# Patient Record
Sex: FEMALE | Race: WHITE | NOT HISPANIC OR LATINO | Employment: STUDENT | URBAN - METROPOLITAN AREA
[De-identification: names, ages, dates, MRNs, and addresses within clinical notes are randomized per-mention and may not be internally consistent; named-entity substitution may affect disease eponyms.]

---
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
## 2022-03-27 ENCOUNTER — HEALTH MAINTENANCE LETTER (OUTPATIENT)
Age: 22
End: 2022-03-27

## 2022-08-01 DIAGNOSIS — L70.0 ACNE VULGARIS: ICD-10-CM

## 2022-08-01 RX ORDER — DESOGESTREL AND ETHINYL ESTRADIOL 0.15-0.03
1 KIT ORAL DAILY
Qty: 84 TABLET | Refills: 0 | Status: SHIPPED | OUTPATIENT
Start: 2022-08-01 | End: 2022-10-13 | Stop reason: SINTOL

## 2022-09-25 ENCOUNTER — HEALTH MAINTENANCE LETTER (OUTPATIENT)
Age: 22
End: 2022-09-25

## 2022-10-04 PROBLEM — R11.2 NON-INTRACTABLE VOMITING WITH NAUSEA: Status: ACTIVE | Noted: 2021-10-27

## 2022-10-06 ASSESSMENT — ANXIETY QUESTIONNAIRES
6. BECOMING EASILY ANNOYED OR IRRITABLE: MORE THAN HALF THE DAYS
3. WORRYING TOO MUCH ABOUT DIFFERENT THINGS: MORE THAN HALF THE DAYS
5. BEING SO RESTLESS THAT IT IS HARD TO SIT STILL: NEARLY EVERY DAY
GAD7 TOTAL SCORE: 15
4. TROUBLE RELAXING: NEARLY EVERY DAY
IF YOU CHECKED OFF ANY PROBLEMS ON THIS QUESTIONNAIRE, HOW DIFFICULT HAVE THESE PROBLEMS MADE IT FOR YOU TO DO YOUR WORK, TAKE CARE OF THINGS AT HOME, OR GET ALONG WITH OTHER PEOPLE: VERY DIFFICULT
2. NOT BEING ABLE TO STOP OR CONTROL WORRYING: MORE THAN HALF THE DAYS
GAD7 TOTAL SCORE: 15
7. FEELING AFRAID AS IF SOMETHING AWFUL MIGHT HAPPEN: SEVERAL DAYS
8. IF YOU CHECKED OFF ANY PROBLEMS, HOW DIFFICULT HAVE THESE MADE IT FOR YOU TO DO YOUR WORK, TAKE CARE OF THINGS AT HOME, OR GET ALONG WITH OTHER PEOPLE?: VERY DIFFICULT
GAD7 TOTAL SCORE: 15
7. FEELING AFRAID AS IF SOMETHING AWFUL MIGHT HAPPEN: SEVERAL DAYS
1. FEELING NERVOUS, ANXIOUS, OR ON EDGE: MORE THAN HALF THE DAYS

## 2022-10-06 ASSESSMENT — PATIENT HEALTH QUESTIONNAIRE - PHQ9
SUM OF ALL RESPONSES TO PHQ QUESTIONS 1-9: 17
SUM OF ALL RESPONSES TO PHQ QUESTIONS 1-9: 17
10. IF YOU CHECKED OFF ANY PROBLEMS, HOW DIFFICULT HAVE THESE PROBLEMS MADE IT FOR YOU TO DO YOUR WORK, TAKE CARE OF THINGS AT HOME, OR GET ALONG WITH OTHER PEOPLE: SOMEWHAT DIFFICULT

## 2022-10-13 ENCOUNTER — VIRTUAL VISIT (OUTPATIENT)
Dept: FAMILY MEDICINE | Facility: CLINIC | Age: 22
End: 2022-10-13
Payer: COMMERCIAL

## 2022-10-13 DIAGNOSIS — L70.0 ACNE VULGARIS: ICD-10-CM

## 2022-10-13 DIAGNOSIS — F41.9 ANXIETY: Primary | ICD-10-CM

## 2022-10-13 DIAGNOSIS — R11.2 NON-INTRACTABLE VOMITING WITH NAUSEA: ICD-10-CM

## 2022-10-13 PROCEDURE — 99214 OFFICE O/P EST MOD 30 MIN: CPT | Mod: GT | Performed by: FAMILY MEDICINE

## 2022-10-13 RX ORDER — FAMOTIDINE 40 MG/1
TABLET, FILM COATED ORAL
COMMUNITY
Start: 2021-03-05

## 2022-10-13 RX ORDER — POLYMYXIN B SULFATE AND TRIMETHOPRIM 1; 10000 MG/ML; [USP'U]/ML
1 SOLUTION OPHTHALMIC EVERY 4 HOURS
COMMUNITY
Start: 2022-10-10 | End: 2022-10-17

## 2022-10-13 RX ORDER — SERTRALINE HYDROCHLORIDE 25 MG/1
25 TABLET, FILM COATED ORAL EVERY MORNING
Qty: 30 TABLET | Refills: 1 | Status: SHIPPED | OUTPATIENT
Start: 2022-10-13 | End: 2022-11-06

## 2022-10-13 RX ORDER — NORETHINDRONE ACETATE AND ETHINYL ESTRADIOL 1MG-20(21)
1 KIT ORAL DAILY
Qty: 84 TABLET | Refills: 3 | Status: SHIPPED | OUTPATIENT
Start: 2022-10-13 | End: 2023-11-16

## 2022-10-13 ASSESSMENT — PATIENT HEALTH QUESTIONNAIRE - PHQ9
10. IF YOU CHECKED OFF ANY PROBLEMS, HOW DIFFICULT HAVE THESE PROBLEMS MADE IT FOR YOU TO DO YOUR WORK, TAKE CARE OF THINGS AT HOME, OR GET ALONG WITH OTHER PEOPLE: SOMEWHAT DIFFICULT
SUM OF ALL RESPONSES TO PHQ QUESTIONS 1-9: 17

## 2022-10-13 ASSESSMENT — ANXIETY QUESTIONNAIRES: GAD7 TOTAL SCORE: 15

## 2022-10-13 NOTE — PROGRESS NOTES
Rosalia is a 22 year old who is being evaluated via a billable video visit.      How would you like to obtain your AVS? MyChart  If the video visit is dropped, the invitation should be resent by: Text to cell phone: [phone]  Will anyone else be joining your video visit? No    Assessment & Plan     Anxiety  Little improvement of anxiety symptoms on Lexapro so she self discontinued this.  We will trial alternative SSRI, sertraline.  Recommend follow-up in 1 month to reassess and possibly increase dose prior to changing to an alternative class of medication.  Also encourage she look into psychotherapy through her [employer] [employer].  - sertraline (ZOLOFT) 25 MG tablet  Dispense: 30 tablet; Refill: 1    Acne vulgaris  Non-intractable vomiting with nausea, unspecified vomiting type  Recently started OCP for acne but has since developed recurrent morning nausea/vomiting suspect due to sensitivity to estrogen.  Because of this we will discontinue current OCP and change to lower hormone dose.  She has seen dermatology in the past and failed alternative regimens.  If symptoms persist despite dose adjustment, should retrial alternative topicals versus spironolactone.  - norethindrone-ethinyl estradiol (JUNEL FE 1/20) 1-20 MG-MCG tablet  Dispense: 84 tablet; Refill: 3    Dot Rosario [facility]    Subjective   Rosalia is a 22 year old, presenting for the following health issues:  Recheck Medication (Med check, stopped taking lexapro doesn't seem to work)      History of Present Illness       Mental Health Follow-up:  Patient presents to follow-up on Depression & Anxiety.Patient's depression since last visit has been:  Better  The patient is not having other symptoms associated with depression.  Patient's anxiety since last visit has been:  Bad  The patient is having other symptoms associated with anxiety.  Any significant life events: other  Patient is feeling anxious or having  panic attacks.  Patient has no concerns about alcohol or drug use.    She eats 2-3 servings of fruits and vegetables daily.She consumes 0 sweetened beverage(s) daily.She exercises with enough effort to increase her heart rate 30 to 60 minutes per day.  She exercises with enough effort to increase her heart rate 4 days per week. She is missing 1 dose(s) of medications per week.  She is not taking prescribed medications regularly due to side effects.    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.  Living with friends which has been helpful for her mental health, she has been able to be more social.  Started on Lexapro last year for anxiety.  Only noticed mild improvement with this medication and self discontinued it 4 months ago.  Feels anxiety is still a significant concern.  She has not looked into psychotherapy.  She is not having panic attacks.  Denies SI/HI.    After being seen last year for nausea/vomiting she was able to follow-up with GI.  Underwent endoscopy with no significant findings.  She discontinued her OCP and emesis resolved.  More recently she restarted OCP for acne and her symptoms returned.  She typically throws up every morning since being back on the pill.      Objective           Vitals:  No vitals were obtained today due to virtual visit.    Physical Exam   GENERAL: Healthy, alert and no distress  EYES: Eyes grossly normal to inspection.  No discharge or erythema, or obvious scleral/conjunctival abnormalities.  RESP: No audible wheeze, cough, or visible cyanosis.  No visible retractions or increased work of breathing.    SKIN: Visible skin clear. No  significant rash, abnormal pigmentation or lesions.  NEURO: Cranial nerves grossly intact.  Mentation and speech appropriate for age.  PSYCH: Mentation appears normal, affect normal/bright, judgement and insight intact, normal speech and appearance well-groomed.          Video-Visit Details    Video Start Time: 10:10 AM    Type of service:  Video Visit    Video End Time:10:20 AM    Originating Location (pt. Location): Home    Distant Location (provider location):  [facility]     Platform used for Video Visit: Rounds

## 2022-11-04 DIAGNOSIS — F41.9 ANXIETY: ICD-10-CM

## 2022-11-05 NOTE — TELEPHONE ENCOUNTER
"Routing refill request to provider for review/approval because:  Patient needs to be seen because it has been more than 1 year since last office visit.    ?PCP    Last Written Prescription Date:  10/13/22  Last Fill Quantity: 30,  # refills: 1   Last office visit provider:  10/27/21     Requested Prescriptions   Pending Prescriptions Disp Refills     sertraline (ZOLOFT) 25 MG tablet [Pharmacy Med Name: SERTRALINE HCL 25 MG TABLET] 90 tablet 1     Sig: TAKE 1 TABLET BY MOUTH EVERY MORNING       SSRIs Protocol Passed - 11/4/2022  9:33 AM        Passed - Recent (12 mo) or future (30 days) visit within the authorizing provider's specialty     Patient has had an office visit with the authorizing provider or a provider within the authorizing providers department within the previous 12 mos or has a future within next 30 days. See \"Patient Info\" tab in inbasket, or \"Choose Columns\" in Meds & Orders section of the refill encounter.              Passed - Medication is active on med list        Passed - Patient is age 18 or older        Passed - No active pregnancy on record        Passed - No positive pregnancy test in last 12 months             Leora Olsen, RN 11/05/22 5:26 PM  "

## 2022-11-06 RX ORDER — SERTRALINE HYDROCHLORIDE 25 MG/1
TABLET, FILM COATED ORAL
Qty: 90 TABLET | Refills: 1 | Status: SHIPPED | OUTPATIENT
Start: 2022-11-06 | End: 2023-05-04

## 2023-05-04 DIAGNOSIS — F41.9 ANXIETY: ICD-10-CM

## 2023-05-04 RX ORDER — SERTRALINE HYDROCHLORIDE 25 MG/1
TABLET, FILM COATED ORAL
Qty: 90 TABLET | Refills: 0 | Status: SHIPPED | OUTPATIENT
Start: 2023-05-04 | End: 2023-11-16

## 2023-05-05 NOTE — TELEPHONE ENCOUNTER
"Last Written Prescription Date:  11/6/2022  Last Fill Quantity: 90,  # refills:1   Last office visit provider:  Virtual visit 10/13/2022 with DR BESSIE Rosario @ [facility]     Requested Prescriptions   Pending Prescriptions Disp Refills     sertraline (ZOLOFT) 25 MG tablet [Pharmacy Med Name: SERTRALINE HCL 25 MG TABLET] 90 tablet 1     Sig: TAKE 1 TABLET BY MOUTH EVERY DAY IN THE MORNING       SSRIs Protocol Passed - 5/4/2023  3:06 AM        Passed - Recent (12 mo) or future (30 days) visit within the authorizing provider's specialty     Patient has had an office visit with the authorizing provider or a provider within the authorizing providers department within the previous 12 mos or has a future within next 30 days. See \"Patient Info\" tab in inbasket, or \"Choose Columns\" in Meds & Orders section of the refill encounter.              Passed - Medication is active on med list        Passed - Patient is age 18 or older        Passed - No active pregnancy on record        Passed - No positive pregnancy test in last 12 months             Rosa Silva RN 05/04/23 9:02 PM  "

## 2023-05-08 ENCOUNTER — HEALTH MAINTENANCE LETTER (OUTPATIENT)
Age: 23
End: 2023-05-08

## 2023-09-21 ENCOUNTER — LAB REQUISITION (OUTPATIENT)
Dept: LAB | Facility: CLINIC | Age: 23
End: 2023-09-21
Payer: COMMERCIAL

## 2023-09-21 DIAGNOSIS — Z11.3 ENCOUNTER FOR SCREENING FOR INFECTIONS WITH A PREDOMINANTLY SEXUAL MODE OF TRANSMISSION: ICD-10-CM

## 2023-09-21 DIAGNOSIS — Z01.419 ENCOUNTER FOR GYNECOLOGICAL EXAMINATION (GENERAL) (ROUTINE) WITHOUT ABNORMAL FINDINGS: ICD-10-CM

## 2023-09-21 PROCEDURE — G0145 SCR C/V CYTO,THINLAYER,RESCR: HCPCS | Mod: ORL | Performed by: STUDENT IN AN ORGANIZED HEALTH CARE EDUCATION/TRAINING PROGRAM

## 2023-09-21 PROCEDURE — 87491 CHLMYD TRACH DNA AMP PROBE: CPT | Mod: ORL | Performed by: STUDENT IN AN ORGANIZED HEALTH CARE EDUCATION/TRAINING PROGRAM

## 2023-09-22 LAB
C TRACH DNA SPEC QL PROBE+SIG AMP: NEGATIVE
N GONORRHOEA DNA SPEC QL NAA+PROBE: NEGATIVE

## 2023-09-25 LAB
BKR LAB AP GYN ADEQUACY: NORMAL
BKR LAB AP GYN INTERPRETATION: NORMAL
BKR LAB AP HPV REFLEX: NORMAL
BKR LAB AP LMP: NORMAL
BKR LAB AP PREVIOUS ABNL DX: NORMAL
BKR LAB AP PREVIOUS ABNORMAL: NORMAL
PATH REPORT.COMMENTS IMP SPEC: NORMAL
PATH REPORT.COMMENTS IMP SPEC: NORMAL
PATH REPORT.RELEVANT HX SPEC: NORMAL

## 2023-11-15 ASSESSMENT — ANXIETY QUESTIONNAIRES
1. FEELING NERVOUS, ANXIOUS, OR ON EDGE: MORE THAN HALF THE DAYS
IF YOU CHECKED OFF ANY PROBLEMS ON THIS QUESTIONNAIRE, HOW DIFFICULT HAVE THESE PROBLEMS MADE IT FOR YOU TO DO YOUR WORK, TAKE CARE OF THINGS AT HOME, OR GET ALONG WITH OTHER PEOPLE: SOMEWHAT DIFFICULT
5. BEING SO RESTLESS THAT IT IS HARD TO SIT STILL: SEVERAL DAYS
GAD7 TOTAL SCORE: 13
4. TROUBLE RELAXING: SEVERAL DAYS
GAD7 TOTAL SCORE: 13
2. NOT BEING ABLE TO STOP OR CONTROL WORRYING: NEARLY EVERY DAY
6. BECOMING EASILY ANNOYED OR IRRITABLE: NEARLY EVERY DAY
3. WORRYING TOO MUCH ABOUT DIFFERENT THINGS: MORE THAN HALF THE DAYS
7. FEELING AFRAID AS IF SOMETHING AWFUL MIGHT HAPPEN: SEVERAL DAYS

## 2023-11-15 ASSESSMENT — PATIENT HEALTH QUESTIONNAIRE - PHQ9
10. IF YOU CHECKED OFF ANY PROBLEMS, HOW DIFFICULT HAVE THESE PROBLEMS MADE IT FOR YOU TO DO YOUR WORK, TAKE CARE OF THINGS AT HOME, OR GET ALONG WITH OTHER PEOPLE: SOMEWHAT DIFFICULT
SUM OF ALL RESPONSES TO PHQ QUESTIONS 1-9: 10
SUM OF ALL RESPONSES TO PHQ QUESTIONS 1-9: 10

## 2023-11-16 ENCOUNTER — OFFICE VISIT (OUTPATIENT)
Dept: FAMILY MEDICINE | Facility: CLINIC | Age: 23
End: 2023-11-16
Payer: COMMERCIAL

## 2023-11-16 VITALS
OXYGEN SATURATION: 96 % | SYSTOLIC BLOOD PRESSURE: 124 MMHG | HEART RATE: 80 BPM | DIASTOLIC BLOOD PRESSURE: 74 MMHG | HEIGHT: 69 IN | BODY MASS INDEX: 27.62 KG/M2 | WEIGHT: 186.5 LBS | TEMPERATURE: 98.6 F | RESPIRATION RATE: 16 BRPM

## 2023-11-16 DIAGNOSIS — R11.0 NAUSEA: ICD-10-CM

## 2023-11-16 DIAGNOSIS — K31.89 REACTIVE GASTROPATHY: ICD-10-CM

## 2023-11-16 DIAGNOSIS — F41.9 ANXIETY: ICD-10-CM

## 2023-11-16 DIAGNOSIS — F32.1 CURRENT MODERATE EPISODE OF MAJOR DEPRESSIVE DISORDER WITHOUT PRIOR EPISODE (H): ICD-10-CM

## 2023-11-16 DIAGNOSIS — K31.89 REACTIVE GASTROPATHY: Primary | ICD-10-CM

## 2023-11-16 DIAGNOSIS — R14.0 BLOATING: ICD-10-CM

## 2023-11-16 PROBLEM — R10.9 NONSPECIFIC ABDOMINAL PAIN: Status: ACTIVE | Noted: 2021-11-03

## 2023-11-16 PROBLEM — R11.14 BILIOUS VOMITING: Status: ACTIVE | Noted: 2023-11-16

## 2023-11-16 PROBLEM — K29.70 GASTRITIS: Status: ACTIVE | Noted: 2021-12-07

## 2023-11-16 PROCEDURE — 99214 OFFICE O/P EST MOD 30 MIN: CPT | Performed by: FAMILY MEDICINE

## 2023-11-16 RX ORDER — OMEPRAZOLE 40 MG/1
40 CAPSULE, DELAYED RELEASE ORAL DAILY
Qty: 90 CAPSULE | Refills: 0 | Status: SHIPPED | OUTPATIENT
Start: 2023-11-16

## 2023-11-16 RX ORDER — FAMOTIDINE 20 MG/1
20 TABLET, FILM COATED ORAL 2 TIMES DAILY
Qty: 180 TABLET | Refills: 0 | OUTPATIENT
Start: 2023-11-16

## 2023-11-16 RX ORDER — ONDANSETRON 4 MG/1
4 TABLET, ORALLY DISINTEGRATING ORAL EVERY 8 HOURS PRN
Qty: 18 TABLET | Refills: 1 | Status: SHIPPED | OUTPATIENT
Start: 2023-11-16

## 2023-11-16 RX ORDER — FLUOXETINE 10 MG/1
10 CAPSULE ORAL DAILY
Qty: 90 CAPSULE | Refills: 3 | Status: SHIPPED | OUTPATIENT
Start: 2023-11-16

## 2023-11-16 RX ORDER — ETONOGESTREL AND ETHINYL ESTRADIOL VAGINAL RING .015; .12 MG/D; MG/D
RING VAGINAL
COMMUNITY

## 2023-11-16 RX ORDER — SIMETHICONE 125 MG
125 TABLET,CHEWABLE ORAL 4 TIMES DAILY PRN
Qty: 60 TABLET | Refills: 1 | Status: SHIPPED | OUTPATIENT
Start: 2023-11-16

## 2023-11-16 RX ORDER — FAMOTIDINE 20 MG/1
20 TABLET, FILM COATED ORAL 2 TIMES DAILY
Qty: 180 TABLET | Refills: 0 | Status: SHIPPED | OUTPATIENT
Start: 2023-11-16

## 2023-11-16 ASSESSMENT — ENCOUNTER SYMPTOMS
NAUSEA: 1
NERVOUS/ANXIOUS: 1

## 2023-11-16 ASSESSMENT — ANXIETY QUESTIONNAIRES
6. BECOMING EASILY ANNOYED OR IRRITABLE: MORE THAN HALF THE DAYS
5. BEING SO RESTLESS THAT IT IS HARD TO SIT STILL: SEVERAL DAYS
1. FEELING NERVOUS, ANXIOUS, OR ON EDGE: MORE THAN HALF THE DAYS
7. FEELING AFRAID AS IF SOMETHING AWFUL MIGHT HAPPEN: NOT AT ALL
3. WORRYING TOO MUCH ABOUT DIFFERENT THINGS: SEVERAL DAYS
2. NOT BEING ABLE TO STOP OR CONTROL WORRYING: MORE THAN HALF THE DAYS
IF YOU CHECKED OFF ANY PROBLEMS ON THIS QUESTIONNAIRE, HOW DIFFICULT HAVE THESE PROBLEMS MADE IT FOR YOU TO DO YOUR WORK, TAKE CARE OF THINGS AT HOME, OR GET ALONG WITH OTHER PEOPLE: SOMEWHAT DIFFICULT

## 2023-11-16 NOTE — PROGRESS NOTES
Assessment & Plan     Rosalia Rizvi is a 23 year old female, new to me today, presenting for evaluation of anxiety and nausea.    1. Reactive gastropathy  2. Nausea  3. Bloating  - omeprazole (PRILOSEC) 40 MG DR capsule; Take 1 capsule (40 mg) by mouth daily  Dispense: 90 capsule; Refill: 0  - famotidine (PEPCID) 20 MG tablet; Take 1 tablet (20 mg) by mouth 2 times daily  Dispense: 180 tablet; Refill: 0  - ondansetron (ZOFRAN ODT) 4 MG ODT tab; Take 1 tablet (4 mg) by mouth every 8 hours as needed for nausea  Dispense: 18 tablet; Refill: 1  - simethicone (MYLICON) 125 MG chewable tablet; Take 1 tablet (125 mg) by mouth 4 times daily as needed for intestinal gas  Dispense: 60 tablet; Refill: 1    Reviewed note from NG and EGD note.  Previous reactive gastropathy.  Negative for H. pylori.  Reviewed triggers.  Encouraged to avoid alcohol, NSAIDs, acidic or spicy foods, peppermint, caffeine, tobacco, manage stress.  Currently on omeprazole 20 mg daily.  Step up to 40 mg daily.  Can also take Pepcid 20 mg twice daily as needed for breakthrough symptoms.  Plan to treat for the next 6 to 8 weeks and then titrate off the PPI if able.  Anxiety may be exacerbating her GI symptoms.  Recommend restarting SSRI.  As SSRI may exacerbate any nausea, will send in prescription for Zofran to use as needed transiently while getting used to the medication.  Recommended trial of low FODMAP diet.  Provided patient education. Also trial simethicone for gas and bloating.  If symptoms not improving, can refer back to [facility] for follow-up as previously instructed.  No red flag symptoms present today to proceed with follow-up endoscopy or imaging.    4. Anxiety  5. Current moderate episode of major depressive disorder without prior episode (H)  - FLUoxetine (PROZAC) 10 MG capsule; Take 1 capsule (10 mg) by mouth daily  Dispense: 90 capsule; Refill: 3  - ondansetron (ZOFRAN ODT) 4 MG ODT tab; Take 1 tablet (4 mg) by  mouth every 8 hours as needed for nausea  Dispense: 18 tablet; Refill: 1    Mood scores positive for anxiety and depression.  History of anxiety and depression.  Previously failed Lexapro and Zoloft as she felt flat and did not feel the medications were helping.  I do think they will help her with her GI symptoms, do encourage her to restart.  We will trial Prozac this time.  Also encouraged to establish care with a counselor at school.  We can titrate the Prozac in 2 to 4 weeks.  Reach out by Khoi with an update on symptom control.    Return in about 3 months (around 2/16/2024) for follow up visit.    Neeta Olguin, [facility]    Nabil Lindsey is a 23 year old, presenting for the following health issues:  Anxiety (Discuss starting a medication.) and Nausea (And vomiting )      11/16/2023     7:50 AM   Additional Questions   Roomed by Alivia FORD CMA   Accompanied by Self         11/16/2023     7:51 AM   Patient Reported Additional Medications   Patient reports taking the following new medications Omeprazole       Anxiety  Associated symptoms include nausea.   Nausea  Associated symptoms include nausea.   History of Present Illness       Mental Health Follow-up:  Patient presents to follow-up on Depression & Anxiety.Patient's depression since last visit has been:  Medium  The patient is not having other symptoms associated with depression.  Patient's anxiety since last visit has been:  Worse  The patient is having other symptoms associated with anxiety.  Any significant life events: No  Patient is feeling anxious or having panic attacks.  Patient has no concerns about alcohol or drug use.    Reason for visit:  I have been throwing up stomach acid in the mornings amd gagging and it is very uncomfortable. I believe that maybe trying a new depression/ anxiety medication may help this or going back on my old one.    She eats 2-3 servings of fruits and vegetables daily.She  consumes 0 sweetened beverage(s) daily.She exercises with enough effort to increase her heart rate 30 to 60 minutes per day.  She exercises with enough effort to increase her heart rate 4 days per week.   She is taking medications regularly.     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    NAUSEA:    Previously saw [facility] on November 3, 2021.  She was evaluated for chronic nausea and vomiting.  [place] recommended upper endoscopy, omeprazole 20 mg once daily 30 minutes before meal.  Also Pepcid 20 mg twice daily as needed heartburn.  Recommended to follow-up after EGD. Hasn't seen GI since.    EGD done December 2021, reactive gastropathy, H pylori negative.  Uses ibuprofen as needed for headaches, once every other week. Alternates tylenol and advil.   Alcohol on weekends.     States she has been taking omeprazole OTC, has taken for the last month, hasn't helped her nausea.  Wondering if GI symptoms related to anxiety.    Wondering about IBS? Bloating, using the bathroom more often.  Urge to have BM 10-15 minutes after eating, sometimes cramping, improved after BM.   No real diarrhea or constipation.  No blood in stool.     Mom wondering about reflux hypersensitivity?    ANXIETY:    Previous prescription for Zoloft. Was on zoloft in the past, felt emotionless. Didn't titrate up.   Previously on Lexapro as well, 10mg daily, changed to zoloft.   More generalized - class, friends, stupid stuff.       10/27/2021    11:00 AM 10/6/2022     8:47 AM 11/15/2023     9:39 AM   TIMOTHY-7 SCORE   Total Score  15 (severe anxiety) 13 (moderate anxiety)   Total Score 14 15 13   GAD7: 9/21        10/27/2021    11:00 AM 10/6/2022     8:47 AM 11/15/2023     9:34 AM   PHQ   PHQ-9 Total Score 13 17 10   Q9: Thoughts of better off dead/self-harm past 2 weeks Not at all Not at all Not at all       HEALTH MAINTENANCE:   - flu: declines   - covid: declines     Review of Systems   Gastrointestinal:  Positive for nausea.  "  Psychiatric/Behavioral:  The patient is nervous/anxious.       No unexplained weight loss.   No fevers or night sweats.   No blood in stools.  No dysphagia        Objective    /74 (BP Location: Left arm, Patient Position: Sitting, Cuff Size: Adult Regular)   Pulse 80   Temp 98.6  F (37  C) (Oral)   Resp 16   Ht 1.74 m (5' 8.5\")   Wt 84.6 kg (186 lb 8 oz)   LMP 11/15/2023 (Exact Date)   SpO2 96%   BMI 27.94 kg/m    Body mass index is 27.94 kg/m .  Physical Exam   GENERAL: Naida is a pleasant, nontoxic female, no acute distress.  HEART: Regular rate and rhythm, no murmurs.  LUNGS: Clear to auscultation bilaterally, unlabored.  ABDOMEN: Soft, nontender to palpation.  No palpable masses.  No guarding, no rigidity, no rebound tenderness.                "

## 2023-11-16 NOTE — TELEPHONE ENCOUNTER
Pt reported not taking today 11/16. Do they need refill?    40 MG Tabs, TAKE 1 TABLET (40 MG) BY MOUTH 2 TIMES DAILY FOR 14 DAYS  Patient not taking: Reported on 11/16/2023

## 2023-11-17 DIAGNOSIS — R11.0 NAUSEA: ICD-10-CM

## 2023-11-17 DIAGNOSIS — K31.89 REACTIVE GASTROPATHY: ICD-10-CM

## 2023-11-19 NOTE — TELEPHONE ENCOUNTER
Patient should contact pharmacy to see what covered alternative is to pepcid on her formulary.  Neeta Olguin, DO

## 2023-11-20 RX ORDER — FAMOTIDINE 20 MG/1
20 TABLET, FILM COATED ORAL 2 TIMES DAILY
Qty: 180 TABLET | Refills: 0 | OUTPATIENT
Start: 2023-11-20

## 2023-11-20 NOTE — TELEPHONE ENCOUNTER
Patient called back and she let me know that she was waiting on a refill. She does not need this anymore. She told me she bought this over the counter and started taking this, this weekend.

## 2024-09-12 ENCOUNTER — MYC REFILL (OUTPATIENT)
Dept: FAMILY MEDICINE | Facility: CLINIC | Age: 24
End: 2024-09-12
Payer: COMMERCIAL

## 2024-09-12 RX ORDER — ETONOGESTREL AND ETHINYL ESTRADIOL VAGINAL RING .015; .12 MG/D; MG/D
RING VAGINAL
OUTPATIENT
Start: 2024-09-12

## 2024-09-12 NOTE — TELEPHONE ENCOUNTER
Schools typically have student health clinics that commonly deal with contraception management. Has she looked into a local option? Then she wouldn't need to drive home.  Neeta Olguin, DO

## 2024-09-12 NOTE — TELEPHONE ENCOUNTER
Left message with patient to call us back (see previous messages) relay Dr. Olguin's message when pt asks if Sharif can prescribe b4 seeing her at the scheduled appt in Oct. (Soonest pt could get to)

## 2024-09-12 NOTE — TELEPHONE ENCOUNTER
Patient is currently at school in [place] and can get home on weekends so can get here on Friday's after 12 pm.  I booked her in the 1st available which was 10/4/24 1:40pm but she is wondering if she could please get this filled in the mean time.  Told her I would check & someone would get back to her as to whether or not the refill could be completed.  Thanks.

## 2024-09-12 NOTE — TELEPHONE ENCOUNTER
I have not prescribed this medication for patient before.  Please schedule appointment.  Neeta Olguin, DO

## 2024-12-01 ENCOUNTER — HEALTH MAINTENANCE LETTER (OUTPATIENT)
Age: 24
End: 2024-12-01

## 2025-01-03 ENCOUNTER — TRANSFERRED RECORDS (OUTPATIENT)
Dept: HEALTH INFORMATION MANAGEMENT | Facility: CLINIC | Age: 25
End: 2025-01-03
Payer: COMMERCIAL

## 2025-01-03 LAB — TSH SERPL-ACNC: 1.02 UIU/ML (ref 0.45–4.5)

## 2025-01-28 ENCOUNTER — TRANSFERRED RECORDS (OUTPATIENT)
Dept: HEALTH INFORMATION MANAGEMENT | Facility: CLINIC | Age: 25
End: 2025-01-28
Payer: COMMERCIAL